# Patient Record
Sex: MALE
[De-identification: names, ages, dates, MRNs, and addresses within clinical notes are randomized per-mention and may not be internally consistent; named-entity substitution may affect disease eponyms.]

---

## 2021-02-02 ENCOUNTER — TRANSCRIPTION ENCOUNTER (OUTPATIENT)
Age: 46
End: 2021-02-02

## 2021-12-30 ENCOUNTER — TRANSCRIPTION ENCOUNTER (OUTPATIENT)
Age: 46
End: 2021-12-30

## 2022-01-05 ENCOUNTER — TRANSCRIPTION ENCOUNTER (OUTPATIENT)
Age: 47
End: 2022-01-05

## 2024-06-10 PROBLEM — Z00.00 ENCOUNTER FOR PREVENTIVE HEALTH EXAMINATION: Status: ACTIVE | Noted: 2024-06-10

## 2024-06-18 ENCOUNTER — APPOINTMENT (OUTPATIENT)
Dept: UROLOGY | Facility: CLINIC | Age: 49
End: 2024-06-18
Payer: COMMERCIAL

## 2024-06-18 VITALS
BODY MASS INDEX: 35.55 KG/M2 | HEART RATE: 70 BPM | SYSTOLIC BLOOD PRESSURE: 148 MMHG | HEIGHT: 69 IN | DIASTOLIC BLOOD PRESSURE: 99 MMHG | TEMPERATURE: 97 F | OXYGEN SATURATION: 100 % | WEIGHT: 240 LBS

## 2024-06-18 DIAGNOSIS — Z86.79 PERSONAL HISTORY OF OTHER DISEASES OF THE CIRCULATORY SYSTEM: ICD-10-CM

## 2024-06-18 DIAGNOSIS — R97.20 ELEVATED PROSTATE, SPECIFIC ANTIGEN [PSA]: ICD-10-CM

## 2024-06-18 DIAGNOSIS — Z78.9 OTHER SPECIFIED HEALTH STATUS: ICD-10-CM

## 2024-06-18 DIAGNOSIS — I10 ESSENTIAL (PRIMARY) HYPERTENSION: ICD-10-CM

## 2024-06-18 DIAGNOSIS — Z87.898 PERSONAL HISTORY OF OTHER SPECIFIED CONDITIONS: ICD-10-CM

## 2024-06-18 LAB
BILIRUB UR QL STRIP: NEGATIVE
COLLECTION METHOD: NORMAL
GLUCOSE UR-MCNC: NEGATIVE
HCG UR QL: 0.2 EU/DL
HGB UR QL STRIP.AUTO: NORMAL
KETONES UR-MCNC: NEGATIVE
LEUKOCYTE ESTERASE UR QL STRIP: NEGATIVE
NITRITE UR QL STRIP: NEGATIVE
PH UR STRIP: 6
PROT UR STRIP-MCNC: NEGATIVE
SP GR UR STRIP: 1.01

## 2024-06-18 PROCEDURE — 81003 URINALYSIS AUTO W/O SCOPE: CPT | Mod: QW

## 2024-06-18 PROCEDURE — 99204 OFFICE O/P NEW MOD 45 MIN: CPT | Mod: 25

## 2024-06-18 RX ORDER — TELMISARTAN 40 MG/1
40 TABLET ORAL
Refills: 0 | Status: ACTIVE | COMMUNITY

## 2024-06-18 NOTE — HISTORY OF PRESENT ILLNESS
[FreeTextEntry1] : 47 yo male is a new patient here for elevated PSA.   PCP ordered PSA as part of annual labs. Labs personally reviewed by me and discussed with the patient.  PSA trends 5/24/2024: 4.17 4/14/2022: 2.67  Denies urinary frequency, dysuria, or gross hematuria.  Denies family history of prostate cancer  Denies ever smoking

## 2024-06-18 NOTE — ASSESSMENT
[FreeTextEntry1] : 47 yo male with elevated PSA.   Informed patient of etiologies of elevated PSA including UTI, prostatitis, prostate cancer, ejaculation within 48 hours.  Repeat PSA ordered. Will inform patient of result.  Informed patient if repeat PSA is elevated, will order prostate MRI.

## 2024-07-03 ENCOUNTER — NON-APPOINTMENT (OUTPATIENT)
Age: 49
End: 2024-07-03

## 2024-07-26 ENCOUNTER — APPOINTMENT (OUTPATIENT)
Dept: UROLOGY | Facility: CLINIC | Age: 49
End: 2024-07-26
Payer: COMMERCIAL

## 2024-07-26 VITALS
HEART RATE: 77 BPM | BODY MASS INDEX: 32.58 KG/M2 | WEIGHT: 220 LBS | SYSTOLIC BLOOD PRESSURE: 157 MMHG | DIASTOLIC BLOOD PRESSURE: 100 MMHG | RESPIRATION RATE: 18 BRPM | HEIGHT: 69 IN

## 2024-07-26 DIAGNOSIS — R97.20 ELEVATED PROSTATE, SPECIFIC ANTIGEN [PSA]: ICD-10-CM

## 2024-07-26 DIAGNOSIS — R93.89 ABNORMAL FINDINGS ON DIAGNOSTIC IMAGING OF OTHER SPECIFIED BODY STRUCTURES: ICD-10-CM

## 2024-07-26 PROCEDURE — 99204 OFFICE O/P NEW MOD 45 MIN: CPT

## 2024-07-26 PROCEDURE — 99214 OFFICE O/P EST MOD 30 MIN: CPT

## 2024-07-26 PROCEDURE — G2211 COMPLEX E/M VISIT ADD ON: CPT | Mod: NC

## 2024-07-26 NOTE — HISTORY OF PRESENT ILLNESS
[FreeTextEntry1] : 49 yo male who was initially referred for an elevated PSA is here to follow up on prostate MRI results.   I personally reviewed the prostate MRI and discussed the results with the patient. Prostate MRI (7/16/2024): -Prostate volume 26 cc -PSA density 0.30 -Diffuse prostatitis -There is a questionable focal area of increased signal in the left posterior lateral peripheral zone (at the mid gland). This measures 1 x 0.9 cm. In the setting of diffuse prostatitis, this area is limited in evaluation. However, a PIRADS 3 lesion is not excluded. -Of note, there is a midline prostatic cyst measuring 0.5 x 0.4 x 1.3 cm.  PSA trends: -07/01/2024: total 7.8; percent free 4% -05/24/2024: 4.17 -04/14/2022: 2.67  Denies fever, chills, nausea, vomiting, urinary frequency, dysuria, changes in urinary habits, or gross hematuria. Denies family history of prostate cancer.

## 2024-07-26 NOTE — HISTORY OF PRESENT ILLNESS
[FreeTextEntry1] : 47 yo male who was initially referred for an elevated PSA is here to follow up on prostate MRI results.   I personally reviewed the prostate MRI and discussed the results with the patient. Prostate MRI (7/16/2024): -Prostate volume 26 cc -PSA density 0.30 -Diffuse prostatitis -There is a questionable focal area of increased signal in the left posterior lateral peripheral zone (at the mid gland). This measures 1 x 0.9 cm. In the setting of diffuse prostatitis, this area is limited in evaluation. However, a PIRADS 3 lesion is not excluded. -Of note, there is a midline prostatic cyst measuring 0.5 x 0.4 x 1.3 cm.  PSA trends: -07/01/2024: total 7.8; percent free 4% -05/24/2024: 4.17 -04/14/2022: 2.67  Denies fever, chills, nausea, vomiting, urinary frequency, dysuria, changes in urinary habits, or gross hematuria. Denies family history of prostate cancer.

## 2024-07-26 NOTE — ASSESSMENT
[FreeTextEntry1] : 48-year-old male with increasing PSA and abnormal prostate MRI.  Patient with uptrending PSA, prostate MRI with PI-RADS 3, PSA density 0.30 and questionable focal area. Prostate MRI also shows diffuse prostatitis.  However, patient is completely asymptomatic. We discussed the above results and explained the PI-RADS scoring system.  Informed patient the way to definitively diagnose prostate cancer is by biopsy.  Offered TP prostate biopsy.  Informed patient that the procedure is done under anesthesia.  Discussed risks of infection, bleeding, hematuria, hematospermia, and urinary retention.  Patient would like to proceed with TP prostate biopsy, which the OR booking order has been placed for.

## 2024-07-26 NOTE — PHYSICAL EXAM
[Normal Appearance] : normal appearance [Well Groomed] : well groomed [General Appearance - In No Acute Distress] : no acute distress [] : no respiratory distress [Respiration, Rhythm And Depth] : normal respiratory rhythm and effort [Exaggerated Use Of Accessory Muscles For Inspiration] : no accessory muscle use [Normal Station and Gait] : the gait and station were normal for the patient's age [No Focal Deficits] : no focal deficits [Oriented To Time, Place, And Person] : oriented to person, place, and time [Affect] : the affect was normal [Mood] : the mood was normal

## 2024-08-02 ENCOUNTER — RESULT REVIEW (OUTPATIENT)
Age: 49
End: 2024-08-02

## 2024-08-08 ENCOUNTER — NON-APPOINTMENT (OUTPATIENT)
Age: 49
End: 2024-08-08

## 2024-08-13 ENCOUNTER — TRANSCRIPTION ENCOUNTER (OUTPATIENT)
Age: 49
End: 2024-08-13

## 2024-08-13 ENCOUNTER — RESULT REVIEW (OUTPATIENT)
Age: 49
End: 2024-08-13

## 2024-08-13 ENCOUNTER — APPOINTMENT (OUTPATIENT)
Dept: UROLOGY | Facility: HOSPITAL | Age: 49
End: 2024-08-13

## 2024-08-13 ENCOUNTER — NON-APPOINTMENT (OUTPATIENT)
Age: 49
End: 2024-08-13

## 2024-08-13 DIAGNOSIS — R97.20 ELEVATED PROSTATE, SPECIFIC ANTIGEN [PSA]: ICD-10-CM

## 2024-08-14 ENCOUNTER — NON-APPOINTMENT (OUTPATIENT)
Age: 49
End: 2024-08-14

## 2024-08-16 ENCOUNTER — TRANSCRIPTION ENCOUNTER (OUTPATIENT)
Age: 49
End: 2024-08-16

## 2024-08-30 ENCOUNTER — APPOINTMENT (OUTPATIENT)
Dept: UROLOGY | Facility: CLINIC | Age: 49
End: 2024-08-30

## 2024-08-30 DIAGNOSIS — R97.20 ELEVATED PROSTATE, SPECIFIC ANTIGEN [PSA]: ICD-10-CM

## 2024-08-30 DIAGNOSIS — R93.89 ABNORMAL FINDINGS ON DIAGNOSTIC IMAGING OF OTHER SPECIFIED BODY STRUCTURES: ICD-10-CM

## 2024-08-30 DIAGNOSIS — C61 MALIGNANT NEOPLASM OF PROSTATE: ICD-10-CM

## 2024-08-30 PROCEDURE — G2211 COMPLEX E/M VISIT ADD ON: CPT | Mod: NC

## 2024-08-30 PROCEDURE — 99213 OFFICE O/P EST LOW 20 MIN: CPT

## 2024-08-30 NOTE — ASSESSMENT
[FreeTextEntry1] : 49-year-old male with elevated PSA, abnormal prostate MRI, and prostate biopsy showing adenocarninoma.   4 of 13 core prostate biopsies show adenocarcinoma of the prostate, Brandon score 3+4=7.  Informed patient that prostate cancer is usually slow growing.  Discussed methods of management for prostate cancer including active surveillance, surgical removal of prostate, and radiation to prostate. Given that there are multiple core biopsies showing adenocarcinoma with [place] score 3+4=7, did not recommend active surveillance. Will refer patient to see Dr. Mixon to discuss surgical management option and will refer patient to see Dr. White to discuss radiation option.

## 2024-08-30 NOTE — HISTORY OF PRESENT ILLNESS
[FreeTextEntry1] : 49-year-old male who was initially referred for an elevated PSA is here to follow-up on his prostate biopsy pathology.  PSA trends: -07/01/2024: total 7.8; percent free 4% -05/24/2024: 4.17 -04/14/2022: 2.67   Prostate MRI (7/16/2024): -Prostate volume 26 cc -PSA density 0.30 -Diffuse prostatitis -There is a questionable focal area of increased signal in the left posterior lateral peripheral zone (at the mid gland). This measures 1 x 0.9 cm. In the setting of diffuse prostatitis, this area is limited in evaluation. However, a PIRADS 3 lesion is not excluded. -Of note, there is a midline prostatic cyst measuring 0.5 x 0.4 x 1.3 cm.   Reviewed prostate biopsy (08/13/2024) results and discussed with patient. Final Diagnosis 1. Prostate, right medial apex, needle biopsy -Benign prostate tissue.  2. Prostate, right medial base, needle biopsy -Benign prostate tissue.  3. Prostate, right lateral apex, needle biopsy -Benign prostate tissue.  4. Prostate, right lateral base, needle biopsy -Benign prostate tissue.  5. Prostate, right lateral, needle biopsy -Benign prostate tissue.  6. Prostate, right anterior, needle biopsy -Benign prostate tissue.  7. Prostate, left medial apex, needle biopsy -Adenocarcinoma of the prostate, Prognostic Grade Group 2 (Pierce score 3+4=7) involving 70% (8 mm in length) of 1 of 1 core(s). Pierce pattern 4 comprises 10% of tumor.  8. Prostate, left medial base, needle biopsy -Adenocarcinoma of the prostate, Prognostic Grade Group 2 (Pierce score 3+4=7) involving 90% (10 mm in length) of 1 of 1 core(s). Brandon pattern 4 comprises 45% of tumor.  9. Prostate, left lateral apex, needle biopsy -Adenocarcinoma of the prostate, Prognostic Grade Group 2 (Pierce score 3+4=7) involving 100% (11 mm in length) of 1 of 1 core(s). Pierce pattern 4 comprises 45% of tumor.  10. Prostate, left lateral base, needle biopsy -Benign fibromuscular tissue. -Prostatic glands are not identified.  11. Prostate, left lateral, needle biopsy -Benign prostate tissue.  12. Prostate, left anterior, needle biopsy -Benign prostate tissue.  13. Prostate, region of interest, needle biopsy -Adenocarcinoma of the prostate, Prognostic Grade Group 2 (Pierce score 3+4=7) involving 100% and 75% (10.5 mm and 7.5 mm in length) of 2 of 2 core(s). Pierce pattern 4 comprises 20% of tumor. -Perineural invasion is identified.  Denies fever, night sweats, dysuria, gross hematuria, bone pain, unintentional weight loss.

## 2024-09-11 ENCOUNTER — NON-APPOINTMENT (OUTPATIENT)
Age: 49
End: 2024-09-11

## 2024-09-12 ENCOUNTER — APPOINTMENT (OUTPATIENT)
Dept: RADIATION ONCOLOGY | Facility: HOSPITAL | Age: 49
End: 2024-09-12
Payer: COMMERCIAL

## 2024-09-12 ENCOUNTER — NON-APPOINTMENT (OUTPATIENT)
Age: 49
End: 2024-09-12

## 2024-09-12 ENCOUNTER — APPOINTMENT (OUTPATIENT)
Dept: UROLOGY | Facility: CLINIC | Age: 49
End: 2024-09-12
Payer: COMMERCIAL

## 2024-09-12 VITALS
WEIGHT: 230 LBS | TEMPERATURE: 98.1 F | SYSTOLIC BLOOD PRESSURE: 131 MMHG | RESPIRATION RATE: 16 BRPM | HEART RATE: 73 BPM | HEIGHT: 69 IN | OXYGEN SATURATION: 99 % | BODY MASS INDEX: 34.07 KG/M2 | DIASTOLIC BLOOD PRESSURE: 89 MMHG

## 2024-09-12 DIAGNOSIS — C61 MALIGNANT NEOPLASM OF PROSTATE: ICD-10-CM

## 2024-09-12 DIAGNOSIS — Z78.9 OTHER SPECIFIED HEALTH STATUS: ICD-10-CM

## 2024-09-12 LAB
BILIRUB UR QL STRIP: NORMAL
COLLECTION METHOD: NORMAL
GLUCOSE UR-MCNC: NORMAL
HCG UR QL: 0.2 EU/DL
HGB UR QL STRIP.AUTO: NORMAL
KETONES UR-MCNC: NORMAL
LEUKOCYTE ESTERASE UR QL STRIP: NORMAL
NITRITE UR QL STRIP: NORMAL
PH UR STRIP: 5.5
PROT UR STRIP-MCNC: NORMAL
SP GR UR STRIP: 1.02

## 2024-09-12 PROCEDURE — 99205 OFFICE O/P NEW HI 60 MIN: CPT

## 2024-09-12 PROCEDURE — G2211 COMPLEX E/M VISIT ADD ON: CPT | Mod: NC

## 2024-09-12 PROCEDURE — 81003 URINALYSIS AUTO W/O SCOPE: CPT | Mod: QW

## 2024-09-12 PROCEDURE — 99215 OFFICE O/P EST HI 40 MIN: CPT | Mod: 25

## 2024-09-12 NOTE — ADDENDUM
[FreeTextEntry1] : Patient's note was transcribed with the assistance of a medical scribe under the supervision of Dr. Mixon. I, Dr. Mixon, have reviewed the patient's chart and agree that it aligns with my medical decisions. Martha Lau, our scribe, also served as a chaperone for physical examination purposes.  The submitted E/M billing level for this visit reflects the total time spent on the day of the visit including face-to-face time spent with the patient, non-face-to-face review of medical records and relevant information, documentation, and asynchronous communication with the patient after a visit via phone, email, or patients EHR portal after the visit.  The medical records reviewed are either scanned into the chart or reviewed with the patient using a patients electronic medical records portal for patients with records not available to [facility] via electronic transmission platforms from other institutions and labs.  Time spend counseling and performing coordination of care was also included in determining the appropriate EM billing level.

## 2024-09-12 NOTE — ASSESSMENT
[FreeTextEntry1] : MICHELE GARNER is a 49-year-old male with hx of elevated PSA and MRI prostate revealed PIRADS 3 lesion, sp TP fusion targeted prostate biopsy (08/13/2024) 4/13 cores positive with Brandon 3+4 PCa, Grade Group 2.   - Pt elects to undergo RALP/PLND. Bilateral complete NS.  Pt is interested in sperm banking which I recommended. - f/u with in 4 weeks. PSMA PET CT to rule out metastatic disease, as we discussed metastatic disease which    Our discussion summarized -- The natural history of this disease was explained to the patient at length and the treatment options discussed including radical prostatectomy by open or robotic-assisted laparoscopic approach, external-beam radiotherapy, brachytherapy, and watchful waiting, active surveillance, including the probability of success and complications associated with these approaches.  With respect to AS/watchful waiting, we discussed the difficulties of estimating the extent of disease preoperatively, the risk of cancer progression, and risk that salvage might not be possible with progression.  However, the favorable long-term outcomes of active surveillance in appropriately selected patients was conveyed.   With respect to seed implants, we discussed risks including but not limited to cancer recurrence, exacerbation of voiding symptoms or hematuria, urinary retention, induction of 2nd malignancy, and risks of rectal symptoms or bleeding.  We also discussed risks of the anesthesia including but not limited to MI, CVA, DVT, and PE.   With respect to EBRT, we discussed we discussed risks including but not limited to cancer recurrence, exacerbation of voiding symptoms or hematuria, urinary retention, incontinence, stricture of the urinary tract, induction of 2nd malignancy, and risks of rectal symptoms or bleeding.  We discussed fact that rectal symptoms may be more common with EBRT than with other treatment modalities. I did convey that the risk of erectile dysfunction was likely lower with EBRT, at least in the short-term.  With radiation therapy, we discussed the difficulties in diagnosing recurrent disease at an early stage due to variability in PSA levels and the fact that most patients are not candidates for local salvage therapy when biochemical recurrence is declared.  We also discussed the significant morbidity of local salvage therapy in terms of perioperative complications, erectile dysfunction and urinary incontinence.  With respect to radical prostatectomy, the pros and cons of open vs robotic-assisted laparoscopic prostatectomy were discussed. The complications of this procedure were reviewed with the patient and include (but not limited to) urinary incontinence, erectile dysfunction, infertility, anastomotic stricture, lymphocele, hemorrhage requiring transfusion, rectal, ureteral, or nerve injury, infection, cardiovascular, pulmonary, thromboembolic, and anesthetic complications.  For robotic prostatectomy, the additional complications of anastomotic urine leak and small bowel obstruction from adhesions was conveyed. We also discussed the need for a urethral catheter as well as a ULCI drain post-operatively.  With surgery, we also discussed the potential advantage over radiation therapy in that biochemical recurrence can be detected at a relatively earlier stage and that salvage radiotherapy is successful in controlling recurrent disease in a substantial proportion of patients.  I also conveyed that salvage radiotherapy was associated with a considerably more favorable morbidity profile compared to local salvage therapies for radiorecurrent disease.   We also discussed prostate cryotherapy in detail, including aspects related to the procedure and the outcomes with respect to cancer control, urinary dysfunction, impotence, and morbidity.  All of the patient questions were answered.

## 2024-09-12 NOTE — HISTORY OF PRESENT ILLNESS
[FreeTextEntry1] : MICHELE GARNER is a 49-year-old male with hx of elevated PSA and MRI prostate revealed PIRADS 3 lesion, sp TP fusion targeted prostate biopsy (08/13/2024) 4/13 cores positive with Brandon 3+4 PCa, Grade Group 2.   Pt presents today with his wife. He reports stable urination. He has no issues with ED. He is having normal bowel movements and is not on blood thinners. Denies flank pain, gross hematuria, dysuria or associated symptoms. He has 2 children with his ex-wife and he is interested in having more children with his current partner   MR Prostate w/wo IV Cont 07/11//2024 - images independently reviewed by me - On my read, no intravesical protrusion of the prostate. I agree with the findings. No definitive NONA, SVI or lymphadenopathy. Hyperintensity DWI left PZ. IMPRESSION: Findings consistent with diffuse prostatitis. Questionable indeterminate area in the left posterior lateral peripheral zone, which may be related to the diffuse prostatitis. However, a PIRADS 3 lesion is not excluded A repeat prostatic MRI is recommended when the above findings resolve for a more accurate evaluation and characterization of the prostate. Prostate Volume: 26 mL PSA density: 0.30 ng/mL/mL  PSA trends: -07/01/2024: total 7.8; percent free 4% -05/24/2024: 4.17 -04/14/2022: 2.67   history: Denies previous kidney stones, recurrent UTIs. No instrumentation. Family History: denies  malignancies. Social History: , 2 kids with ex-spouse, currently ,  PSHx: no abdominalpelvic surgeries

## 2024-09-12 NOTE — PHYSICAL EXAM
[Normal Appearance] : normal appearance [Well Groomed] : well groomed [General Appearance - In No Acute Distress] : no acute distress [Edema] : no peripheral edema [Respiration, Rhythm And Depth] : normal respiratory rhythm and effort [Exaggerated Use Of Accessory Muscles For Inspiration] : no accessory muscle use [Abdomen Soft] : soft [Abdomen Tenderness] : non-tender [Costovertebral Angle Tenderness] : no ~M costovertebral angle tenderness [Normal Station and Gait] : the gait and station were normal for the patient's age [] : no rash [No Focal Deficits] : no focal deficits [Oriented To Time, Place, And Person] : oriented to person, place, and time [Affect] : the affect was normal [Mood] : the mood was normal [de-identified] : mild protuberant abdomen. no scars.

## 2024-09-12 NOTE — PHYSICAL EXAM
[General Appearance - Well Developed] : well developed [General Appearance - In No Acute Distress] : in no acute distress [General Appearance - Alert] : alert [] : no respiratory distress [Oriented To Time, Place, And Person] : oriented to person, place, and time

## 2024-09-13 ENCOUNTER — NON-APPOINTMENT (OUTPATIENT)
Age: 49
End: 2024-09-13

## 2024-09-17 NOTE — HISTORY OF PRESENT ILLNESS
[FreeTextEntry1] : Mr.Jaison Ivory is 49yr old male here for initial consultation for prostate cancer. He was referred to Urology after he had an elevated PSA of 7.8ng/ml on 7/1/24. He went on to have an MRI prostate on 7/11/24. The MRI showed a 26ml prostate, and "diffuse prostatis, Questionable indeterminate area in the left posterior lateral peripheral zone, which may be related to the diffuse prostatitis, PIRADS 3 lesion was not excluded". Biopsy of prostate was done on 8/13/24 and it had 4/13 cores showing Brandon 7(3+4)Pca. He has an appointment today with  to also discuss surgical options. His IPSS score is 1. Epic score 0. He does not have any urinary frequency and has one episode of nocturia.   PSA: 7/1/24: PSA 7.8ng/ml 5/24/24: PSA 4.17ng/ml   MRI Prostate:7/11/24 IMPRESSION: Findings consistent with diffuse prostatitis. Questionable indeterminate area in the left posterior lateral peripheral zone, which may be related to the diffuse prostatitis. However, a PIRADS 3 lesion is not excluded A repeat prostatic MRI is recommended when the above findings resolve for a more accurate evaluation and characterization of the prostate.  Prostate Volume: 26 mL PSA density: 0.30 ng/mL/mL    Prostate Biopsy:8/13/24 Final Diagnosis 1. Prostate, right medial apex, needle biopsy-Benign prostate tissue. 2. Prostate, right medial base, needle biopsy-Benign prostate tissue. 3. Prostate, right lateral apex, needle biopsy-Benign prostate tissue. 4. Prostate, right lateral base, needle biopsy-Benign prostate tissue. 5. Prostate, right lateral, needle biopsy-Benign prostate tissue. 6. Prostate, right anterior, needle biopsy-Benign prostate tissue. 7. Prostate, left medial apex, needle biopsy-Adenocarcinoma of the prostate, Prognostic Grade Group 2 ([place] score 3+4=7) involving 70% (8 mm in length) of 1 of 1 core(s). Brandon pattern 4 comprises 10% of tumor. 8. Prostate, left medial base, needle biopsy-Adenocarcinoma of the prostate, Prognostic Grade Group 2 (Brandon score 3+4=7) involving 90% (10 mm in length) of 1 of 1 core(s). Brandon pattern 4 comprises 45% of tumor. 9. Prostate, left lateral apex, needle biopsy-Adenocarcinoma of the prostate, Prognostic Grade Group 2 ([place] score 3+4=7) involving 100% (11 mm in length) of 1 of 1 core(s). Brandon pattern 4 comprises 45% of tumor. 10. Prostate, left lateral base, needle biopsy-Benign fibromuscular tissue.-Prostatic glands are not identified. 11. Prostate, left lateral, needle biopsy-Benign prostate tissue. 12. Prostate, left anterior, needle biopsy-Benign prostate tissue. 13. Prostate, region of interest, needle biopsy-Adenocarcinoma of the prostate, Prognostic Grade Group 2 ([place] score 3+4=7) involving 100% and 75% (10.5 mm and 7.5 mm in length) of 2 of 2 core(s). Brandon pattern 4 comprises 20% of tumor. -Perineural invasion is identified.

## 2024-09-17 NOTE — HISTORY OF PRESENT ILLNESS
[FreeTextEntry1] : Mr.Jaison Ivory is 49yr old male here for initial consultation for prostate cancer. He was referred to Urology after he had an elevated PSA of 7.8ng/ml on 7/1/24. He went on to have an MRI prostate on 7/11/24. The MRI showed a 26ml prostate, and "diffuse prostatis, Questionable indeterminate area in the left posterior lateral peripheral zone, which may be related to the diffuse prostatitis, PIRADS 3 lesion was not excluded". Biopsy of prostate was done on 8/13/24 and it had 4/13 cores showing Brandon 7(3+4)Pca. He has an appointment today with  to also discuss surgical options. His IPSS score is 1. Epic score 0. He does not have any urinary frequency and has one episode of nocturia.   PSA: 7/1/24: PSA 7.8ng/ml 5/24/24: PSA 4.17ng/ml   MRI Prostate:7/11/24 IMPRESSION: Findings consistent with diffuse prostatitis. Questionable indeterminate area in the left posterior lateral peripheral zone, which may be related to the diffuse prostatitis. However, a PIRADS 3 lesion is not excluded A repeat prostatic MRI is recommended when the above findings resolve for a more accurate evaluation and characterization of the prostate.  Prostate Volume: 26 mL PSA density: 0.30 ng/mL/mL    Prostate Biopsy:8/13/24 Final Diagnosis 1. Prostate, right medial apex, needle biopsy-Benign prostate tissue. 2. Prostate, right medial base, needle biopsy-Benign prostate tissue. 3. Prostate, right lateral apex, needle biopsy-Benign prostate tissue. 4. Prostate, right lateral base, needle biopsy-Benign prostate tissue. 5. Prostate, right lateral, needle biopsy-Benign prostate tissue. 6. Prostate, right anterior, needle biopsy-Benign prostate tissue. 7. Prostate, left medial apex, needle biopsy-Adenocarcinoma of the prostate, Prognostic Grade Group 2 ([place] score 3+4=7) involving 70% (8 mm in length) of 1 of 1 core(s). Brandon pattern 4 comprises 10% of tumor. 8. Prostate, left medial base, needle biopsy-Adenocarcinoma of the prostate, Prognostic Grade Group 2 (Brandon score 3+4=7) involving 90% (10 mm in length) of 1 of 1 core(s). Brandon pattern 4 comprises 45% of tumor. 9. Prostate, left lateral apex, needle biopsy-Adenocarcinoma of the prostate, Prognostic Grade Group 2 ([place] score 3+4=7) involving 100% (11 mm in length) of 1 of 1 core(s). Brandon pattern 4 comprises 45% of tumor. 10. Prostate, left lateral base, needle biopsy-Benign fibromuscular tissue.-Prostatic glands are not identified. 11. Prostate, left lateral, needle biopsy-Benign prostate tissue. 12. Prostate, left anterior, needle biopsy-Benign prostate tissue. 13. Prostate, region of interest, needle biopsy-Adenocarcinoma of the prostate, Prognostic Grade Group 2 ([place] score 3+4=7) involving 100% and 75% (10.5 mm and 7.5 mm in length) of 2 of 2 core(s). Brandon pattern 4 comprises 20% of tumor. -Perineural invasion is identified.

## 2024-09-17 NOTE — DISEASE MANAGEMENT
[1] : T1 [c] : c [X] : MX [0-10] : 0 -10 ng/mL [Biopsy with Fusion] : Patient had a biopsy with fusion on [7(3+4)] : Fusion Biopsy [place] Score: 7(3+4) [3] : 3 [IIB] : IIB [] : Patient had no Bone Scan performed [BiopsyDate] : 8/24 [MeasuredProstateVolume] : 26 [TotalCores] : 13 [TotalPositiveCores] : 4 [MaxCoreInvolvement] : 100

## 2024-09-17 NOTE — DISEASE MANAGEMENT
[1] : T1 [c] : c [X] : MX [0-10] : 0 -10 ng/mL [Biopsy with Fusion] : Patient had a biopsy with fusion on [7(3+4)] : Fusion Biopsy [place] Score: 7(3+4) [3] : 3 [IIB] : IIB [] : Patient had no Bone Scan performed [BiopsyDate] : 8/24 [MeasuredProstateVolume] : 26 [TotalCores] : 13 [TotalPositiveCores] : 4 [MaxCoreInvolvement] : 100

## 2024-09-17 NOTE — HISTORY OF PRESENT ILLNESS
[FreeTextEntry1] : Mr.Jaison Ivory is 49yr old male here for initial consultation for prostate cancer. He was referred to Urology after he had an elevated PSA of 7.8ng/ml on 7/1/24. He went on to have an MRI prostate on 7/11/24. The MRI showed a 26ml prostate, and "diffuse prostatis, Questionable indeterminate area in the left posterior lateral peripheral zone, which may be related to the diffuse prostatitis, PIRADS 3 lesion was not excluded". Biopsy of prostate was done on 8/13/24 and it had 4/13 cores showing Brandon 7(3+4)Pca. He has an appointment today with  to also discuss surgical options. His IPSS score is 1. Epic score 0. He does not have any urinary frequency and has one episode of nocturia.   PSA: 7/1/24: PSA 7.8ng/ml 5/24/24: PSA 4.17ng/ml   MRI Prostate:7/11/24 IMPRESSION: Findings consistent with diffuse prostatitis. Questionable indeterminate area in the left posterior lateral peripheral zone, which may be related to the diffuse prostatitis. However, a PIRADS 3 lesion is not excluded A repeat prostatic MRI is recommended when the above findings resolve for a more accurate evaluation and characterization of the prostate.  Prostate Volume: 26 mL PSA density: 0.30 ng/mL/mL    Prostate Biopsy:8/13/24 Final Diagnosis 1. Prostate, right medial apex, needle biopsy-Benign prostate tissue. 2. Prostate, right medial base, needle biopsy-Benign prostate tissue. 3. Prostate, right lateral apex, needle biopsy-Benign prostate tissue. 4. Prostate, right lateral base, needle biopsy-Benign prostate tissue. 5. Prostate, right lateral, needle biopsy-Benign prostate tissue. 6. Prostate, right anterior, needle biopsy-Benign prostate tissue. 7. Prostate, left medial apex, needle biopsy-Adenocarcinoma of the prostate, Prognostic Grade Group 2 ([place] score 3+4=7) involving 70% (8 mm in length) of 1 of 1 core(s). Brandon pattern 4 comprises 10% of tumor. 8. Prostate, left medial base, needle biopsy-Adenocarcinoma of the prostate, Prognostic Grade Group 2 (Brandon score 3+4=7) involving 90% (10 mm in length) of 1 of 1 core(s). Brandon pattern 4 comprises 45% of tumor. 9. Prostate, left lateral apex, needle biopsy-Adenocarcinoma of the prostate, Prognostic Grade Group 2 ([place] score 3+4=7) involving 100% (11 mm in length) of 1 of 1 core(s). Brandon pattern 4 comprises 45% of tumor. 10. Prostate, left lateral base, needle biopsy-Benign fibromuscular tissue.-Prostatic glands are not identified. 11. Prostate, left lateral, needle biopsy-Benign prostate tissue. 12. Prostate, left anterior, needle biopsy-Benign prostate tissue. 13. Prostate, region of interest, needle biopsy-Adenocarcinoma of the prostate, Prognostic Grade Group 2 ([place] score 3+4=7) involving 100% and 75% (10.5 mm and 7.5 mm in length) of 2 of 2 core(s). Brandon pattern 4 comprises 20% of tumor. -Perineural invasion is identified.

## 2024-09-17 NOTE — REVIEW OF SYSTEMS
[Nocturia] : nocturia [Negative] : Allergic/Immunologic [IPSS Score (0-40): ___] : IPSS score: [unfilled] [EPIC-CP Score (0-60): ___] : EPIC-CP score: [unfilled] [Urinary Ostomy] : no ~T urinary ostomy present [Urinary Frequency] : no urinary frequency [FreeTextEntry7] : scheduled colonoscopy in February/2025, can make it sooner if needed

## 2024-09-17 NOTE — HISTORY OF PRESENT ILLNESS
[FreeTextEntry1] : Mr.Jaison Ivory is 49yr old male here for initial consultation for prostate cancer. He was referred to Urology after he had an elevated PSA of 7.8ng/ml on 7/1/24. He went on to have an MRI prostate on 7/11/24. The MRI showed a 26ml prostate, and "diffuse prostatis, Questionable indeterminate area in the left posterior lateral peripheral zone, which may be related to the diffuse prostatitis, PIRADS 3 lesion was not excluded". Biopsy of prostate was done on 8/13/24 and it had 4/13 cores showing Brandon 7(3+4)Pca. He has an appointment today with  to also discuss surgical options. His IPSS score is 1. Epic score 0. He does not have any urinary frequency and has one episode of nocturia.   PSA: 7/1/24: PSA 7.8ng/ml 5/24/24: PSA 4.17ng/ml   MRI Prostate:7/11/24 IMPRESSION: Findings consistent with diffuse prostatitis. Questionable indeterminate area in the left posterior lateral peripheral zone, which may be related to the diffuse prostatitis. However, a PIRADS 3 lesion is not excluded A repeat prostatic MRI is recommended when the above findings resolve for a more accurate evaluation and characterization of the prostate.  Prostate Volume: 26 mL PSA density: 0.30 ng/mL/mL    Prostate Biopsy:8/13/24 Final Diagnosis 1. Prostate, right medial apex, needle biopsy-Benign prostate tissue. 2. Prostate, right medial base, needle biopsy-Benign prostate tissue. 3. Prostate, right lateral apex, needle biopsy-Benign prostate tissue. 4. Prostate, right lateral base, needle biopsy-Benign prostate tissue. 5. Prostate, right lateral, needle biopsy-Benign prostate tissue. 6. Prostate, right anterior, needle biopsy-Benign prostate tissue. 7. Prostate, left medial apex, needle biopsy-Adenocarcinoma of the prostate, Prognostic Grade Group 2 (Linch score 3+4=7) involving 70% (8 mm in length) of 1 of 1 core(s). Brandon pattern 4 comprises 10% of tumor. 8. Prostate, left medial base, needle biopsy-Adenocarcinoma of the prostate, Prognostic Grade Group 2 (Brandon score 3+4=7) involving 90% (10 mm in length) of 1 of 1 core(s). Brandon pattern 4 comprises 45% of tumor. 9. Prostate, left lateral apex, needle biopsy-Adenocarcinoma of the prostate, Prognostic Grade Group 2 (Linch score 3+4=7) involving 100% (11 mm in length) of 1 of 1 core(s). Brandon pattern 4 comprises 45% of tumor. 10. Prostate, left lateral base, needle biopsy-Benign fibromuscular tissue.-Prostatic glands are not identified. 11. Prostate, left lateral, needle biopsy-Benign prostate tissue. 12. Prostate, left anterior, needle biopsy-Benign prostate tissue. 13. Prostate, region of interest, needle biopsy-Adenocarcinoma of the prostate, Prognostic Grade Group 2 (Linch score 3+4=7) involving 100% and 75% (10.5 mm and 7.5 mm in length) of 2 of 2 core(s). Brandon pattern 4 comprises 20% of tumor. -Perineural invasion is identified.

## 2024-09-17 NOTE — DISEASE MANAGEMENT
[1] : T1 [c] : c [X] : MX [0-10] : 0 -10 ng/mL [Biopsy with Fusion] : Patient had a biopsy with fusion on [7(3+4)] : Fusion Biopsy [place] Score: 7(3+4) [3] : 3 [IIB] : IIB [] : Patient had no Bone Scan performed [BiopsyDate] : 8/24 [MeasuredProstateVolume] : 26 [TotalCores] : 13 [TotalPositiveCores] : 4 [MaxCoreInvolvement] : 100

## 2024-09-17 NOTE — DISEASE MANAGEMENT
[1] : T1 [c] : c [X] : MX [0-10] : 0 -10 ng/mL [Biopsy with Fusion] : Patient had a biopsy with fusion on [7(3+4)] : Fusion Biopsy [place] Score: 7(3+4) [3] : 3 [IIB] : IIB [] : Patient had no Bone Scan performed [BiopsyDate] : 8/24 [MeasuredProstateVolume] : 26 [TotalCores] : 13 [TotalPositiveCores] : 4 [MaxCoreInvolvement] : 100

## 2024-09-17 NOTE — END OF VISIT
[Time Spent: ___ minutes] : I have spent [unfilled] minutes of time on the encounter which excludes teaching and separately reported services. [FreeTextEntry3] : MD Note: I personally performed the evaluation and management services for this patient. This includes conducting the examination, assessing all conditions, and establishing the plan of care. Today, my ACP, Jessy Waldrop, was here to observe my evaluation and management services for this patient. I agree with the documentation above, which I have reviewed and edited where appropriate.

## 2024-09-25 ENCOUNTER — APPOINTMENT (OUTPATIENT)
Dept: UROLOGY | Facility: CLINIC | Age: 49
End: 2024-09-25

## 2024-09-26 ENCOUNTER — TRANSCRIPTION ENCOUNTER (OUTPATIENT)
Age: 49
End: 2024-09-26

## 2024-10-02 ENCOUNTER — TRANSCRIPTION ENCOUNTER (OUTPATIENT)
Age: 49
End: 2024-10-02

## 2024-10-02 ENCOUNTER — RESULT REVIEW (OUTPATIENT)
Age: 49
End: 2024-10-02

## 2024-10-03 ENCOUNTER — NON-APPOINTMENT (OUTPATIENT)
Age: 49
End: 2024-10-03